# Patient Record
Sex: FEMALE | Race: WHITE
[De-identification: names, ages, dates, MRNs, and addresses within clinical notes are randomized per-mention and may not be internally consistent; named-entity substitution may affect disease eponyms.]

---

## 2023-04-28 ENCOUNTER — HOSPITAL ENCOUNTER (EMERGENCY)
Dept: HOSPITAL 75 - ER | Age: 23
Discharge: HOME | End: 2023-04-28
Payer: SELF-PAY

## 2023-04-28 VITALS — DIASTOLIC BLOOD PRESSURE: 64 MMHG | SYSTOLIC BLOOD PRESSURE: 108 MMHG

## 2023-04-28 VITALS — WEIGHT: 124.78 LBS | HEIGHT: 65 IN | BODY MASS INDEX: 20.79 KG/M2
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
## 2023-04-28 DIAGNOSIS — S31.41XA: Primary | ICD-10-CM

## 2023-04-28 DIAGNOSIS — Z23: ICD-10-CM

## 2023-04-28 PROCEDURE — 12002 RPR S/N/AX/GEN/TRNK2.6-7.5CM: CPT

## 2023-04-28 PROCEDURE — 90715 TDAP VACCINE 7 YRS/> IM: CPT

## 2023-04-28 NOTE — ED INTEGUMENTARY GENERAL
General


Stated Complaint:  FALL IN SHOWER | PELVIC INJ


Source:  patient


Exam Limitations:  no limitations





History of Present Illness


Date Seen by Provider:  Apr 28, 2023


Time Seen by Provider:  07:50


Initial Comments


Patient is a 23-year-old female who presents to the emergency department with a 

chief complaint of laceration to the genitalia.  Patient had been drinking last 

night, all night.  She was getting in the shower around 5 AM this morning.  She 

was leaning against the shower door, slipped and fell landing on the frame of 

the shower door.  Patient had significant amounts of vaginal bleeding she 

states.  She has not urinated since the time of the fall.  No other complaints 

of injury.  Last tetanus shot greater than 5 years ago.  She did take 4 

ibuprofen and 2 Tylenol around 5:30 AM.





Last menstrual cycle was a week ago.  She uses condoms, no oral birth control or

implanted birth control


Timing/Duration:  this morning (around 0500)


Severity:  severe


Location:  genitalia


Possible Cause:  other (fall)


Associated Symptoms:  malaise





Allergies and Home Medications


Allergies


Coded Allergies:  


     adhesive (Verified  Allergy, Unknown, 4/28/23)


     nickel (Verified  Allergy, Unknown, 4/28/23)





Patient Home Medication List


Home Medication List Reviewed:  Yes





Review of Systems


Review of Systems


Constitutional:  see HPI


Respiratory:  no symptoms reported


Gastrointestinal:  no symptoms reported


Genitourinary:  pain, other (bleeding from vagina)


Musculoskeletal:  no symptoms reported





Physical Exam


Vital Signs





Vital Signs - First Documented








 4/28/23





 07:50


 


Temp 36.7


 


Pulse 117


 


Resp 19


 


B/P (MAP) 108/64 (79)


 


Pulse Ox 98


 


O2 Delivery Room Air





Capillary Refill :


General Appearance:  WD/WN, mild distress


HEENT:  PERRL/EOMI, other (upper and lower eyelid swelling (crying))


Neck:  full range of motion


Cardiovascular:  regular rate, rhythm (tachy)


Respiratory:  no respiratory distress, no accessory muscle use


Extremities:  normal range of motion, normal inspection


Neurologic/Psychiatric:  alert, normal mood/affect, oriented x 3


Skin:  normal color, warm/dry, other (laceration just lateral to right labia 

majora, 4cm, mild bleeding)





Procedures/Interventions





   Wound Location:  External Genitalia


Other Wound Location


right lateral labia


   Wound Length (cm):  4


   Wound's Depth, Shape:  superficial, linear, sub Q


   Wound Explored:  clean


   Irrigated w/ Saline (ccs):  50


   Anesthesia:  Lidocaine w/ Epi


   Volume Anesthetic (ccs):  7


   Suture:  Prolene


   Suture Size:  5-0


   Number of Sutures:  6


   Layer Closure?:  1


   Sterile Dressing Applied?:  Yes





Progress/Results/Core Measures


Results/Orders


My Orders





Orders - JOHN HARO MD


Dipht,Pertuss(Acell),Tet Adult (Boostrix (4/28/23 08:30)


Lidocaine/Epi 2% 1:100,000 (Xylocaine/Ep (4/28/23 08:30)


Lidocaine/Epi Mpf 2% 1:200,000 (Xylocain (4/28/23 08:43)





Medications Given in ED





Current Medications








 Medications  Dose


 Ordered  Sig/Melvin


 Route  Start Time


 Stop Time Status Last Admin


Dose Admin


 


 Diphtheria/


 Tetanus/Acell


 Pertussis  0.5 ml  ONCE ONCE


 IM  4/28/23 08:30


 4/28/23 08:31 DC 4/28/23 08:48


0.5 ML


 


 Lidocaine/


 Epinephrine  20 ml  STK-MED ONCE


 .ROUTE  4/28/23 08:43


 4/28/23 08:45 DC 4/28/23 08:52


20 ML








Vital Signs/I&O











 4/28/23





 07:50


 


Temp 36.7


 


Pulse 117


 


Resp 19


 


B/P (MAP) 108/64 (79)


 


Pulse Ox 98


 


O2 Delivery Room Air











Departure


Impression





   Primary Impression:  


   Laceration of labia majora


   Qualified Codes:  S31.41XA - Laceration without foreign body of vagina and 

   vulva, initial encounter


Disposition:  01 HOME, SELF-CARE


Condition:  Improved





Departure-Patient Inst.


Decision time for Depature:  09:32


Referrals:  


LUCY ABREU MD (PCP/Family)


Primary Care Physician


Patient Instructions:  Laceration Repair With Stitches ED, Laceration Repair 

With Stitches (DC)





Add. Discharge Instructions:  


Do not get in the shower today, you can wash the area gently with a warm soapy 

washcloth.  Pat to dry do not rub.





Keep a dry gauze dressing over the stitches for 2 or 3 days.  You can place a 

little triple antibiotic ointment over the stitches for 2 days twice a day.





Over-the-counter ibuprofen 3 tablets which is 600 mg every 6 hours with food as 

needed for pain.





Monitor the area for increased redness, swelling and pain or drainage.  Return 

to the emergency room if any of these develop.





The stitches will need to be removed in 10 days.  You can come back to the 

emergency room as it is a part of this visit to have a nurse remove them.


Work/School Note:  Work Release Form   Date Seen in the Emergency Department:  

Apr 28, 2023


   Return to Work:  Apr 30, 2023





Images


Female/Male











1 - Laceration














JOHN HARO MD         Apr 28, 2023 07:59

## 2023-05-09 ENCOUNTER — HOSPITAL ENCOUNTER (EMERGENCY)
Dept: HOSPITAL 75 - ER | Age: 23
Discharge: HOME | End: 2023-05-09
Payer: SELF-PAY

## 2023-05-09 VITALS — DIASTOLIC BLOOD PRESSURE: 67 MMHG | SYSTOLIC BLOOD PRESSURE: 111 MMHG

## 2023-05-09 DIAGNOSIS — Z48.02: Primary | ICD-10-CM
